# Patient Record
Sex: MALE | Race: WHITE | URBAN - METROPOLITAN AREA
[De-identification: names, ages, dates, MRNs, and addresses within clinical notes are randomized per-mention and may not be internally consistent; named-entity substitution may affect disease eponyms.]

---

## 2017-01-12 ENCOUNTER — OFFICE VISIT (OUTPATIENT)
Dept: ORTHOPEDIC SURGERY | Age: 67
End: 2017-01-12

## 2017-01-12 VITALS
DIASTOLIC BLOOD PRESSURE: 77 MMHG | SYSTOLIC BLOOD PRESSURE: 116 MMHG | BODY MASS INDEX: 23.56 KG/M2 | WEIGHT: 173.94 LBS | HEIGHT: 72 IN | HEART RATE: 72 BPM

## 2017-01-12 DIAGNOSIS — M25.512 LEFT SHOULDER PAIN, UNSPECIFIED CHRONICITY: ICD-10-CM

## 2017-01-12 DIAGNOSIS — M75.112 PARTIAL TEAR OF LEFT ROTATOR CUFF: Primary | ICD-10-CM

## 2017-01-12 PROCEDURE — 20610 DRAIN/INJ JOINT/BURSA W/O US: CPT | Performed by: ORTHOPAEDIC SURGERY

## 2017-01-12 PROCEDURE — 99213 OFFICE O/P EST LOW 20 MIN: CPT | Performed by: ORTHOPAEDIC SURGERY

## 2017-01-17 ENCOUNTER — EVALUATION (OUTPATIENT)
Dept: PHYSICAL THERAPY | Age: 67
End: 2017-01-17

## 2017-01-23 ENCOUNTER — EVALUATION (OUTPATIENT)
Dept: PHYSICAL THERAPY | Age: 67
End: 2017-01-23

## 2017-01-23 DIAGNOSIS — M75.22 BICEPS TENDINITIS, LEFT: ICD-10-CM

## 2017-01-23 DIAGNOSIS — M75.82 ROTATOR CUFF TENDINITIS, LEFT: Primary | ICD-10-CM

## 2017-01-23 PROCEDURE — G8984 CARRY CURRENT STATUS: HCPCS | Performed by: PHYSICAL THERAPIST

## 2017-01-23 PROCEDURE — G8985 CARRY GOAL STATUS: HCPCS | Performed by: PHYSICAL THERAPIST

## 2017-01-23 PROCEDURE — 97161 PT EVAL LOW COMPLEX 20 MIN: CPT | Performed by: PHYSICAL THERAPIST

## 2017-01-23 PROCEDURE — 97112 NEUROMUSCULAR REEDUCATION: CPT | Performed by: PHYSICAL THERAPIST

## 2017-01-23 PROCEDURE — 97110 THERAPEUTIC EXERCISES: CPT | Performed by: PHYSICAL THERAPIST

## 2017-01-30 ENCOUNTER — TREATMENT (OUTPATIENT)
Dept: PHYSICAL THERAPY | Age: 67
End: 2017-01-30

## 2017-01-30 DIAGNOSIS — M75.82 ROTATOR CUFF TENDINITIS, LEFT: Primary | ICD-10-CM

## 2017-01-30 DIAGNOSIS — M75.22 BICEPS TENDINITIS, LEFT: ICD-10-CM

## 2017-01-30 PROCEDURE — 97112 NEUROMUSCULAR REEDUCATION: CPT | Performed by: PHYSICAL THERAPIST

## 2017-01-30 PROCEDURE — 97110 THERAPEUTIC EXERCISES: CPT | Performed by: PHYSICAL THERAPIST

## 2017-02-06 ENCOUNTER — TREATMENT (OUTPATIENT)
Dept: PHYSICAL THERAPY | Age: 67
End: 2017-02-06

## 2017-02-06 DIAGNOSIS — M75.82 ROTATOR CUFF TENDINITIS, LEFT: Primary | ICD-10-CM

## 2017-02-06 DIAGNOSIS — M75.22 BICEPS TENDINITIS, LEFT: ICD-10-CM

## 2017-02-06 PROCEDURE — 97112 NEUROMUSCULAR REEDUCATION: CPT | Performed by: PHYSICAL THERAPIST

## 2017-02-06 PROCEDURE — 97110 THERAPEUTIC EXERCISES: CPT | Performed by: PHYSICAL THERAPIST

## 2017-02-13 ENCOUNTER — TREATMENT (OUTPATIENT)
Dept: PHYSICAL THERAPY | Age: 67
End: 2017-02-13

## 2017-02-13 DIAGNOSIS — M75.22 BICEPS TENDINITIS, LEFT: ICD-10-CM

## 2017-02-13 DIAGNOSIS — M75.82 ROTATOR CUFF TENDINITIS, LEFT: Primary | ICD-10-CM

## 2017-02-13 PROCEDURE — 97112 NEUROMUSCULAR REEDUCATION: CPT | Performed by: PHYSICAL THERAPIST

## 2017-02-13 PROCEDURE — 97110 THERAPEUTIC EXERCISES: CPT | Performed by: PHYSICAL THERAPIST

## 2017-02-14 ENCOUNTER — OFFICE VISIT (OUTPATIENT)
Dept: ORTHOPEDIC SURGERY | Age: 67
End: 2017-02-14

## 2017-02-14 VITALS
DIASTOLIC BLOOD PRESSURE: 84 MMHG | BODY MASS INDEX: 23.43 KG/M2 | SYSTOLIC BLOOD PRESSURE: 132 MMHG | WEIGHT: 173 LBS | HEIGHT: 72 IN | HEART RATE: 60 BPM

## 2017-02-14 DIAGNOSIS — M75.112 PARTIAL TEAR OF LEFT ROTATOR CUFF: Primary | ICD-10-CM

## 2017-02-14 DIAGNOSIS — M75.22 BICEPS TENDINITIS, LEFT: ICD-10-CM

## 2017-02-14 PROCEDURE — 99213 OFFICE O/P EST LOW 20 MIN: CPT | Performed by: ORTHOPAEDIC SURGERY

## 2017-04-18 ENCOUNTER — TELEPHONE (OUTPATIENT)
Dept: ENDOCRINOLOGY | Age: 67
End: 2017-04-18

## 2017-06-06 RX ORDER — MELOXICAM 15 MG/1
TABLET ORAL
Qty: 30 TABLET | Refills: 0 | Status: SHIPPED | OUTPATIENT
Start: 2017-06-06

## 2017-06-19 ENCOUNTER — OFFICE VISIT (OUTPATIENT)
Dept: ENDOCRINOLOGY | Age: 67
End: 2017-06-19

## 2017-06-19 VITALS
OXYGEN SATURATION: 98 % | WEIGHT: 177.8 LBS | SYSTOLIC BLOOD PRESSURE: 132 MMHG | HEART RATE: 63 BPM | HEIGHT: 71 IN | RESPIRATION RATE: 16 BRPM | DIASTOLIC BLOOD PRESSURE: 79 MMHG | BODY MASS INDEX: 24.89 KG/M2

## 2017-06-19 DIAGNOSIS — E78.01 FAMILIAL HYPERCHOLESTEROLEMIA: Primary | ICD-10-CM

## 2017-06-19 DIAGNOSIS — E55.9 VITAMIN D DEFICIENCY: ICD-10-CM

## 2017-06-19 DIAGNOSIS — Z78.9 STATIN INTOLERANCE: ICD-10-CM

## 2017-06-19 PROCEDURE — 99215 OFFICE O/P EST HI 40 MIN: CPT | Performed by: INTERNAL MEDICINE

## 2017-08-11 ENCOUNTER — PATIENT MESSAGE (OUTPATIENT)
Dept: ENDOCRINOLOGY | Age: 67
End: 2017-08-11

## 2017-10-06 NOTE — TELEPHONE ENCOUNTER
From: Price Flores  To: Paulina Millard MD  Sent: 10/6/2017 11:22 AM EDT  Subject: Medication Renewal Request    Original authorizing provider: MD Deonte Gross. Anson Mendez would like a refill of the following medications:  alirocumab (PRALUENT) 150 MG/ML SOPN injection pen Any Shen MD]    Preferred pharmacy: [facility]    Comment:  Refills need to be directed to [facility]. They are the specialty pharmacy for [facility] [place]. Contact me if you have questions, Melonie Thakur [phone] My next appointment with Dr. Celia Bowles is June 19, 2018. Will I have enough refills to last till then?

## 2018-06-19 ENCOUNTER — OFFICE VISIT (OUTPATIENT)
Dept: ENDOCRINOLOGY | Age: 68
End: 2018-06-19

## 2018-06-19 VITALS
SYSTOLIC BLOOD PRESSURE: 113 MMHG | BODY MASS INDEX: 24.52 KG/M2 | DIASTOLIC BLOOD PRESSURE: 68 MMHG | HEIGHT: 72 IN | OXYGEN SATURATION: 99 % | RESPIRATION RATE: 16 BRPM | WEIGHT: 181 LBS | HEART RATE: 68 BPM

## 2018-06-19 DIAGNOSIS — E78.49 OTHER HYPERLIPIDEMIA: Primary | ICD-10-CM

## 2018-06-19 DIAGNOSIS — Z78.9 STATIN INTOLERANCE: ICD-10-CM

## 2018-06-19 DIAGNOSIS — E55.9 VITAMIN D DEFICIENCY: ICD-10-CM

## 2018-06-19 PROCEDURE — G8427 DOCREV CUR MEDS BY ELIG CLIN: HCPCS | Performed by: INTERNAL MEDICINE

## 2018-06-19 PROCEDURE — 3017F COLORECTAL CA SCREEN DOC REV: CPT | Performed by: INTERNAL MEDICINE

## 2018-06-19 PROCEDURE — 4040F PNEUMOC VAC/ADMIN/RCVD: CPT | Performed by: INTERNAL MEDICINE

## 2018-06-19 PROCEDURE — 1036F TOBACCO NON-USER: CPT | Performed by: INTERNAL MEDICINE

## 2018-06-19 PROCEDURE — 1123F ACP DISCUSS/DSCN MKR DOCD: CPT | Performed by: INTERNAL MEDICINE

## 2018-06-19 PROCEDURE — G8420 CALC BMI NORM PARAMETERS: HCPCS | Performed by: INTERNAL MEDICINE

## 2018-06-19 PROCEDURE — 99213 OFFICE O/P EST LOW 20 MIN: CPT | Performed by: INTERNAL MEDICINE

## 2018-06-19 RX ORDER — SILDENAFIL 100 MG/1
100 TABLET, FILM COATED ORAL
COMMUNITY
Start: 2018-03-06

## 2018-06-19 RX ORDER — HYDROCHLOROTHIAZIDE 25 MG/1
25 TABLET ORAL
COMMUNITY
Start: 2018-06-14

## 2018-06-19 RX ORDER — LOSARTAN POTASSIUM 25 MG/1
25 TABLET ORAL
COMMUNITY
Start: 2017-05-03

## 2019-06-19 ENCOUNTER — OFFICE VISIT (OUTPATIENT)
Dept: ENDOCRINOLOGY | Age: 69
End: 2019-06-19
Payer: MEDICARE

## 2019-06-19 VITALS — WEIGHT: 187 LBS | DIASTOLIC BLOOD PRESSURE: 70 MMHG | BODY MASS INDEX: 25.72 KG/M2 | SYSTOLIC BLOOD PRESSURE: 130 MMHG

## 2019-06-19 DIAGNOSIS — E78.01 FAMILIAL HYPERCHOLESTEROLEMIA: Primary | ICD-10-CM

## 2019-06-19 PROCEDURE — 1036F TOBACCO NON-USER: CPT | Performed by: INTERNAL MEDICINE

## 2019-06-19 PROCEDURE — 4040F PNEUMOC VAC/ADMIN/RCVD: CPT | Performed by: INTERNAL MEDICINE

## 2019-06-19 PROCEDURE — G8419 CALC BMI OUT NRM PARAM NOF/U: HCPCS | Performed by: INTERNAL MEDICINE

## 2019-06-19 PROCEDURE — 99213 OFFICE O/P EST LOW 20 MIN: CPT | Performed by: INTERNAL MEDICINE

## 2019-06-19 PROCEDURE — G8427 DOCREV CUR MEDS BY ELIG CLIN: HCPCS | Performed by: INTERNAL MEDICINE

## 2019-06-19 PROCEDURE — 1123F ACP DISCUSS/DSCN MKR DOCD: CPT | Performed by: INTERNAL MEDICINE

## 2019-06-19 PROCEDURE — 3017F COLORECTAL CA SCREEN DOC REV: CPT | Performed by: INTERNAL MEDICINE

## 2019-06-19 NOTE — PROGRESS NOTES
Subjective:      70 y/o WM seen for hyperlipidemia    Interim:  Tolerating praluent    He was diagnosed with hyperlipidemia in . He was a patient of Dr. Lizett Hu    Tried simvastatin  , lipitor, crestor, lovastatin, zetia . Had muscle aches, intolerance  No h/o use of Welchol    Results:    3/18 LDL 84   LDL 63   Vit D 68    LDL 81  Vit D 73.5  8/15    10/14      Started on Praluent 75 on 9/11/15. Tolerating well. Stopped Crestor 10mg in October after 4weeks. Was having myalgias, much better now without Crestor 10mg. Carotid Artery Disease (Rt. ICA 40-59% stenosis and Lt ICA 1-39% stenosis from smooth atherosclerotic plaque)     He takes Vit D2 2000 IU daily     LDL 77     FH: Brothers, sisters, Dad Hyperlipidemia    Parents CAD. Dad  of CVA    SH: no smoking    Past Medical History:   Diagnosis Date    Hyperlipidemia     Hypertension      Past Surgical History:   Procedure Laterality Date    COLONOSCOPY      HERNIA REPAIR      umbilical     VASECTOMY       Current Outpatient Medications   Medication Sig Dispense Refill    Cholecalciferol (VITAMIN D3) 5000 units TABS Take by mouth      PRALUENT 150 MG/ML SOPN injection pen INJECT 150MG SUBCUTANEOUSLY EVERY 2 WEEKS 13 pen 1    hydrochlorothiazide (HYDRODIURIL) 25 MG tablet Take 25 mg by mouth      losartan (COZAAR) 25 MG tablet Take 25 mg by mouth      sildenafil (VIAGRA) 100 MG tablet Take 100 mg by mouth      meloxicam (MOBIC) 15 MG tablet TAKE ONE TABLET BY MOUTH DAILY 30 tablet 0    multivitamin (THERAGRAN) per tablet Take 1 tablet by mouth daily.  Coenzyme Q10 (COQ10) 200 MG CAPS Take 1 capsule by mouth 2 times daily.  aspirin 81 MG tablet Take 81 mg by mouth daily.  Glucosamine-Chondroit-Vit C-Mn (GLUCOSAMINE CHONDR 1500 COMPLX) CAPS Take 1 capsule by mouth 2 times daily. No current facility-administered medications for this visit.         Review of Systems  Reviewed and scanned     Objective:    /70   Wt 187 lb (84.8 kg)   BMI 25.72 kg/m²   Wt Readings from Last 3 Encounters:   06/19/19 187 lb (84.8 kg)   06/19/18 181 lb (82.1 kg)   06/19/17 177 lb 12.8 oz (80.6 kg)       Constitutional: Well-developed, appears stated age, cooperative, in no acute distress  H/E/N/M/T:atraumatic, normocephalic, external ears, nose, lips normal without lesions  Eyes: Lids, lashes, conjunctivae and sclerae normal, No proptosis, no redness  Neck: supple, symmetrical, no swelling  Skin: No obvious rashes or lesions present. Skin and hair texture normal  Psychiatric: Judgement and Insight:  judgement and insight appear normal  Neuro: Normal without focal findings, speech is normal normal, speech is spontaneous  Chest: No labored breathing, no chest deformity, no stridor  Musculoskeletal: No joint deformity, swelling    Lab Review  No results found for: TSH  No results found for: FREET4      Assessment: 1. Hyperlipidemia : Familial, Statin intolerance. Cholesterol control on Praluent, will continue given Carotid artery disease  2. Vitamin D deficiency: On replacement     Plan: 1. Continue Praluent  2. Vitamin D  2000 IU daily  3. Check lipids in one year

## 2020-03-17 RX ORDER — ALIROCUMAB 150 MG/ML
INJECTION, SOLUTION SUBCUTANEOUS
Qty: 6 ML | Refills: 2 | Status: SHIPPED | OUTPATIENT
Start: 2020-03-17 | End: 2021-02-25

## 2020-06-17 ENCOUNTER — OFFICE VISIT (OUTPATIENT)
Dept: ENDOCRINOLOGY | Age: 70
End: 2020-06-17
Payer: MEDICARE

## 2020-06-17 VITALS
HEIGHT: 72 IN | WEIGHT: 186.4 LBS | DIASTOLIC BLOOD PRESSURE: 80 MMHG | RESPIRATION RATE: 18 BRPM | HEART RATE: 67 BPM | SYSTOLIC BLOOD PRESSURE: 140 MMHG | BODY MASS INDEX: 25.25 KG/M2 | OXYGEN SATURATION: 96 %

## 2020-06-17 PROCEDURE — G8417 CALC BMI ABV UP PARAM F/U: HCPCS | Performed by: INTERNAL MEDICINE

## 2020-06-17 PROCEDURE — 1123F ACP DISCUSS/DSCN MKR DOCD: CPT | Performed by: INTERNAL MEDICINE

## 2020-06-17 PROCEDURE — G8427 DOCREV CUR MEDS BY ELIG CLIN: HCPCS | Performed by: INTERNAL MEDICINE

## 2020-06-17 PROCEDURE — 99213 OFFICE O/P EST LOW 20 MIN: CPT | Performed by: INTERNAL MEDICINE

## 2020-06-17 PROCEDURE — 4040F PNEUMOC VAC/ADMIN/RCVD: CPT | Performed by: INTERNAL MEDICINE

## 2020-06-17 PROCEDURE — 3017F COLORECTAL CA SCREEN DOC REV: CPT | Performed by: INTERNAL MEDICINE

## 2020-06-17 PROCEDURE — 1036F TOBACCO NON-USER: CPT | Performed by: INTERNAL MEDICINE

## 2021-02-25 RX ORDER — ALIROCUMAB 150 MG/ML
INJECTION, SOLUTION SUBCUTANEOUS
Qty: 6 ML | Refills: 2 | Status: SHIPPED | OUTPATIENT
Start: 2021-02-25 | End: 2021-11-05

## 2021-02-25 NOTE — TELEPHONE ENCOUNTER
Medication:   Requested Prescriptions     Pending Prescriptions Disp Refills    [id number] 150 MG/ML [place] [Pharmacy Med Name: [id number] PEN 150MG/ML] 6 mL 2     Sig: INJECT 150MG SUBCUTANEOUSLY EVERY 2 WEEKS       Last Filled:      Patient Phone Number: [phone] (home)     Last appt: 6/17/2020   Next appt: 6/16/2021    Last Labs DM:   Lab Results   Component Value Date    LABA1C 5.6 02/12/2014     Last Lipid:   Lab Results   Component Value Date    CHOL 171 08/23/2016    TRIG 126 08/23/2016    HDL 65 08/23/2016    LDLCALC 81 08/23/2016     Last PSA: No results found for: PSA  Last Thyroid:   Lab Results   Component Value Date    O2OTZKC 6.0 05/12/2016

## 2021-06-16 ENCOUNTER — OFFICE VISIT (OUTPATIENT)
Dept: ENDOCRINOLOGY | Age: 71
End: 2021-06-16
Payer: MEDICARE

## 2021-06-16 VITALS
DIASTOLIC BLOOD PRESSURE: 74 MMHG | OXYGEN SATURATION: 97 % | WEIGHT: 187 LBS | HEIGHT: 72 IN | HEART RATE: 75 BPM | BODY MASS INDEX: 25.33 KG/M2 | SYSTOLIC BLOOD PRESSURE: 123 MMHG

## 2021-06-16 DIAGNOSIS — R73.01 IMPAIRED FASTING BLOOD SUGAR: ICD-10-CM

## 2021-06-16 DIAGNOSIS — E78.49 OTHER HYPERLIPIDEMIA: Primary | ICD-10-CM

## 2021-06-16 PROCEDURE — 99214 OFFICE O/P EST MOD 30 MIN: CPT | Performed by: INTERNAL MEDICINE

## 2021-06-16 PROCEDURE — 3017F COLORECTAL CA SCREEN DOC REV: CPT | Performed by: INTERNAL MEDICINE

## 2021-06-16 PROCEDURE — 1036F TOBACCO NON-USER: CPT | Performed by: INTERNAL MEDICINE

## 2021-06-16 PROCEDURE — 1123F ACP DISCUSS/DSCN MKR DOCD: CPT | Performed by: INTERNAL MEDICINE

## 2021-06-16 PROCEDURE — G8427 DOCREV CUR MEDS BY ELIG CLIN: HCPCS | Performed by: INTERNAL MEDICINE

## 2021-06-16 PROCEDURE — 4040F PNEUMOC VAC/ADMIN/RCVD: CPT | Performed by: INTERNAL MEDICINE

## 2021-06-16 PROCEDURE — G8417 CALC BMI ABV UP PARAM F/U: HCPCS | Performed by: INTERNAL MEDICINE

## 2021-06-16 NOTE — PROGRESS NOTES
Subjective:      72 y/o WM seen for hyperlipidemia    Interim:  Tolerating praluent    He was diagnosed with hyperlipidemia in . Moderate, controlled    He was a patient of Dr. Justin Linder    Tried simvastatin  , lipitor, crestor, lovastatin, zetia . Had muscle aches, intolerance  No h/o use of Welchol    Results:    3/18 LDL 84   LDL 63   Vit D 68    LDL 81  Vit D 73.5  8/15    10/14      Started on Praluent 75 on 9/11/15. Tolerating well. Stopped Crestor 10mg in October after 4weeks. Was having myalgias, much better now without Crestor 10mg. Carotid Artery Disease (Rt. ICA 40-59% stenosis and Lt ICA 1-39% stenosis from smooth atherosclerotic plaque)     He takes Vit D2 2000 IU daily  Level controlled     LDL 77    LDL 63   LDL 80     He has HTN, on losartan and HCTZ    FH: Brothers, sisters, Dad Hyperlipidemia    Parents CAD. Dad  of CVA    SH: no smoking    Past Medical History:   Diagnosis Date    Hyperlipidemia     Hypertension      Past Surgical History:   Procedure Laterality Date    COLONOSCOPY      HERNIA REPAIR      umbilical     VASECTOMY       Current Outpatient Medications   Medication Sig Dispense Refill    PRALUENT 150 MG/ML SOAJ INJECT 150MG SUBCUTANEOUSLY EVERY 2 WEEKS 6 mL 2    Cholecalciferol (VITAMIN D3) 5000 units TABS Take by mouth      PRALUENT 150 MG/ML SOPN injection pen INJECT 150MG SUBCUTANEOUSLY EVERY 2 WEEKS 13 pen 1    hydrochlorothiazide (HYDRODIURIL) 25 MG tablet Take 25 mg by mouth      losartan (COZAAR) 25 MG tablet Take 25 mg by mouth      sildenafil (VIAGRA) 100 MG tablet Take 100 mg by mouth      meloxicam (MOBIC) 15 MG tablet TAKE ONE TABLET BY MOUTH DAILY 30 tablet 0    multivitamin (THERAGRAN) per tablet Take 1 tablet by mouth daily.  Coenzyme Q10 (COQ10) 200 MG CAPS Take 1 capsule by mouth 2 times daily.  aspirin 81 MG tablet Take 81 mg by mouth daily.        

## 2021-11-05 DIAGNOSIS — E78.49 OTHER HYPERLIPIDEMIA: Primary | ICD-10-CM

## 2021-11-05 RX ORDER — ALIROCUMAB 150 MG/ML
INJECTION, SOLUTION SUBCUTANEOUS
Qty: 6 ML | Refills: 3 | Status: SHIPPED | OUTPATIENT
Start: 2021-11-05 | End: 2022-10-18

## 2022-01-11 ENCOUNTER — TELEPHONE (OUTPATIENT)
Dept: ENDOCRINOLOGY | Age: 72
End: 2022-01-11

## 2022-01-11 NOTE — TELEPHONE ENCOUNTER
Submitted PA for Praluent 150MG/ML auto-injectors Key: [id number] - PA Case ID: [id number]   Via CM STATUS: Approved through 12/31/2022 under medicare part d

## 2022-06-15 ENCOUNTER — OFFICE VISIT (OUTPATIENT)
Dept: ENDOCRINOLOGY | Age: 72
End: 2022-06-15
Payer: MEDICARE

## 2022-06-15 VITALS
DIASTOLIC BLOOD PRESSURE: 72 MMHG | SYSTOLIC BLOOD PRESSURE: 121 MMHG | RESPIRATION RATE: 14 BRPM | HEIGHT: 72 IN | BODY MASS INDEX: 25.47 KG/M2 | TEMPERATURE: 98 F | WEIGHT: 188 LBS | HEART RATE: 67 BPM

## 2022-06-15 DIAGNOSIS — E55.9 VITAMIN D DEFICIENCY: ICD-10-CM

## 2022-06-15 DIAGNOSIS — E78.49 OTHER HYPERLIPIDEMIA: Primary | ICD-10-CM

## 2022-06-15 PROCEDURE — 1036F TOBACCO NON-USER: CPT | Performed by: INTERNAL MEDICINE

## 2022-06-15 PROCEDURE — 3017F COLORECTAL CA SCREEN DOC REV: CPT | Performed by: INTERNAL MEDICINE

## 2022-06-15 PROCEDURE — 99214 OFFICE O/P EST MOD 30 MIN: CPT | Performed by: INTERNAL MEDICINE

## 2022-06-15 PROCEDURE — G8417 CALC BMI ABV UP PARAM F/U: HCPCS | Performed by: INTERNAL MEDICINE

## 2022-06-15 PROCEDURE — G8427 DOCREV CUR MEDS BY ELIG CLIN: HCPCS | Performed by: INTERNAL MEDICINE

## 2022-06-15 PROCEDURE — 1123F ACP DISCUSS/DSCN MKR DOCD: CPT | Performed by: INTERNAL MEDICINE

## 2022-06-15 NOTE — PROGRESS NOTES
Subjective:      70 y/o WM seen for hyperlipidemia    Interim:  Taking praluent    Still has cramps/weakness  when standing  Even since off statin    He was diagnosed with hyperlipidemia in . Moderate, controlled    He was a patient of Dr. Patricia Walker    Tried simvastatin  , lipitor, crestor, lovastatin, zetia . Had muscle aches, intolerance  No h/o use of Welchol    Results:    3/18 LDL 84   LDL 63   Vit D 68    LDL 81  Vit D 73.5  8/15    10/14      Started on Praluent 75 on 9/11/15. Tolerating well. Stopped Crestor 10mg in October after 4weeks. Was having myalgias, much better now without Crestor 10mg. Carotid Artery Disease (Rt. ICA 40-59% stenosis and Lt ICA 1-39% stenosis from smooth atherosclerotic plaque)     He takes Vit D2 2000 IU daily  Level controlled     LDL 77    LDL 63   LDL 80    LDL 87  Tc 163    He has HTN, on losartan and HCTZ    FH: Brothers, sisters, Dad Hyperlipidemia    Parents CAD. Dad  of CVA    SH: no smoking    Past Medical History:   Diagnosis Date    Hyperlipidemia     Hypertension      Past Surgical History:   Procedure Laterality Date    COLONOSCOPY      HERNIA REPAIR      umbilical     VASECTOMY       Current Outpatient Medications   Medication Sig Dispense Refill    PRALUENT 150 MG/ML SOAJ INJECT 150MG SUBCUTANEOUSLY EVERY 2 WEEKS 6 mL 3    Cholecalciferol (VITAMIN D3) 5000 units TABS Take by mouth      hydrochlorothiazide (HYDRODIURIL) 25 MG tablet Take 25 mg by mouth      losartan (COZAAR) 25 MG tablet Take 25 mg by mouth      sildenafil (VIAGRA) 100 MG tablet Take 100 mg by mouth      meloxicam (MOBIC) 15 MG tablet TAKE ONE TABLET BY MOUTH DAILY 30 tablet 0    multivitamin (THERAGRAN) per tablet Take 1 tablet by mouth daily.  Coenzyme Q10 (COQ10) 200 MG CAPS Take 1 capsule by mouth 2 times daily.       Glucosamine-Chondroit-Vit C-Mn (GLUCOSAMINE CHONDR 1500 COMPLX) CAPS Take 1 capsule by mouth 2 times daily.  aspirin 81 MG tablet Take 81 mg by mouth daily. (Patient not taking: Reported on 6/15/2022)       No current facility-administered medications for this visit. Review of Systems  Reviewed and scanned     Objective:       Vitals:    06/15/22 0956   BP: 121/72   Pulse: 67   Resp: 14   Temp: 98 °F (36.7 °C)       Constitutional: Well-developed, appears stated age, cooperative, in no acute distress  H/E/N/M/T:atraumatic, normocephalic, external ears, nose, lips normal without lesions  Eyes: Lids, lashes, conjunctivae and sclerae normal, No proptosis, no redness  Neck: supple, symmetrical, no swelling  Skin: No obvious rashes or lesions present. Skin and hair texture normal  Psychiatric: Judgement and Insight:  judgement and insight appear normal  Neuro: Normal without focal findings, speech is normal normal, speech is spontaneous  Chest: No labored breathing, no chest deformity, no stridor  Musculoskeletal: No joint deformity, swelling    Lab Review  No results found for: TSH  No results found for: FREET4      Assessment: 1. Hyperlipidemia : Familial, Statin intolerance. Cholesterol control on Praluent, will continue given Carotid artery disease  2. Vitamin D deficiency: On replacement   3. HTN: at goal  4. Impaired fasting: Glucose 103, recommend life style change . Last 96mg/dl    Advised statin related side effects should not be persistent, recommend to see PCP or neurology for lower leg weakness    Plan: 1. Continue Praluent  2. Vitamin D  2000 IU daily  3. Check lipids in one year

## 2022-10-18 DIAGNOSIS — E78.49 OTHER HYPERLIPIDEMIA: ICD-10-CM

## 2022-10-18 RX ORDER — ALIROCUMAB 150 MG/ML
INJECTION, SOLUTION SUBCUTANEOUS
Qty: 6 ML | Refills: 3 | Status: SHIPPED | OUTPATIENT
Start: 2022-10-18

## 2022-10-18 NOTE — TELEPHONE ENCOUNTER
Medication:   Requested Prescriptions     Pending Prescriptions Disp Refills    PRALUENT 150 MG/ML SOAJ [Pharmacy Med Name: Chauncey Gonzales PEN 150MG/ML] 6 mL 3     Sig: INJECT 150MG SUBCUTANEOUSLY EVERY 2 WEEKS       Last Filled:      Patient Phone Number: [phone] (home)     Last appt: 6/15/2022   Next appt: 6/14/23    Last Lipid:   Lab Results   Component Value Date/Time    CHOL 171 08/23/2016 11:50 AM    TRIG 126 08/23/2016 11:50 AM    HDL 65 08/23/2016 11:50 AM    LDLCALC 81 08/23/2016 11:50 AM

## 2023-09-18 DIAGNOSIS — E78.49 OTHER HYPERLIPIDEMIA: ICD-10-CM

## 2023-09-18 RX ORDER — ALIROCUMAB 150 MG/ML
INJECTION, SOLUTION SUBCUTANEOUS
Qty: 6 ML | Refills: 3 | Status: SHIPPED | OUTPATIENT
Start: 2023-09-18

## 2023-09-18 NOTE — TELEPHONE ENCOUNTER
Medication:   Requested Prescriptions     Pending Prescriptions Disp Refills    PRALUENT 150 MG/ML SOAJ [Pharmacy Med Name: Adelina Sánchez PEN 150MG/ML] 6 mL 3     Sig: INJECT 150MG SUBCUTANEOUSLY EVERY 2 WEEKS       Last Filled:      Patient Phone Number: [phone] (home)     Last appt: 6/14/2023   Next appt: 6/12/2024    Last Labs DM:   Lab Results   Component Value Date/Time    LABA1C 5.6 02/12/2014 12:36 PM     Last Lipid:   Lab Results   Component Value Date/Time    CHOL 171 08/23/2016 11:50 AM    TRIG 126 08/23/2016 11:50 AM    HDL 65 08/23/2016 11:50 AM    LDLCALC 81 08/23/2016 11:50 AM     Last PSA: No results found for: \"PSA\"  Last Thyroid:   Lab Results   Component Value Date/Time    R6VSBSS 6.0 05/12/2016 10:51 AM

## 2024-06-03 ENCOUNTER — PATIENT MESSAGE (OUTPATIENT)
Dept: ENDOCRINOLOGY | Age: 74
End: 2024-06-03

## 2024-06-03 NOTE — TELEPHONE ENCOUNTER
From: Khalif Valentin  To: Dr. Brian Perdue  Sent: 6/3/2024 10:26 AM EDT  Subject: Blood tests    I have had blood tests done in April 2024 at [place]. I was told by your office that you can see those results. Please confirm you can in deed see them and that I do or do not need to have additional tests done before my appointment with you June 12.    Thanks

## 2024-06-03 NOTE — TELEPHONE ENCOUNTER
I checked in Epic, was not able to see them.  If he already  had labs, does not need repeat and can have them faxed

## 2024-06-12 ENCOUNTER — OFFICE VISIT (OUTPATIENT)
Dept: ENDOCRINOLOGY | Age: 74
End: 2024-06-12
Payer: MEDICARE

## 2024-06-12 ENCOUNTER — TELEPHONE (OUTPATIENT)
Dept: ENDOCRINOLOGY | Age: 74
End: 2024-06-12

## 2024-06-12 VITALS
OXYGEN SATURATION: 97 % | WEIGHT: 183 LBS | SYSTOLIC BLOOD PRESSURE: 140 MMHG | HEART RATE: 74 BPM | DIASTOLIC BLOOD PRESSURE: 78 MMHG | HEIGHT: 72 IN | RESPIRATION RATE: 16 BRPM | BODY MASS INDEX: 24.79 KG/M2

## 2024-06-12 DIAGNOSIS — E78.49 OTHER HYPERLIPIDEMIA: ICD-10-CM

## 2024-06-12 PROCEDURE — 99214 OFFICE O/P EST MOD 30 MIN: CPT | Performed by: INTERNAL MEDICINE

## 2024-06-12 PROCEDURE — G8427 DOCREV CUR MEDS BY ELIG CLIN: HCPCS | Performed by: INTERNAL MEDICINE

## 2024-06-12 PROCEDURE — 1123F ACP DISCUSS/DSCN MKR DOCD: CPT | Performed by: INTERNAL MEDICINE

## 2024-06-12 PROCEDURE — G2211 COMPLEX E/M VISIT ADD ON: HCPCS | Performed by: INTERNAL MEDICINE

## 2024-06-12 PROCEDURE — 3017F COLORECTAL CA SCREEN DOC REV: CPT | Performed by: INTERNAL MEDICINE

## 2024-06-12 PROCEDURE — 1036F TOBACCO NON-USER: CPT | Performed by: INTERNAL MEDICINE

## 2024-06-12 PROCEDURE — G8419 CALC BMI OUT NRM PARAM NOF/U: HCPCS | Performed by: INTERNAL MEDICINE

## 2024-06-12 RX ORDER — ALIROCUMAB 150 MG/ML
INJECTION, SOLUTION SUBCUTANEOUS
Qty: 6 ML | Refills: 3 | Status: SHIPPED | OUTPATIENT
Start: 2024-06-12

## 2024-06-12 RX ORDER — ALIROCUMAB 150 MG/ML
INJECTION, SOLUTION SUBCUTANEOUS
Qty: 6 ML | Refills: 3 | Status: SHIPPED | OUTPATIENT
Start: 2024-06-12 | End: 2024-06-12 | Stop reason: SDUPTHER

## 2024-06-12 NOTE — PROGRESS NOTES
MG tablet Take 1 tablet by mouth      Coenzyme Q10 (COQ10) 200 MG CAPS Take 1 capsule by mouth 2 times daily.       No current facility-administered medications for this visit.       Review of Systems  Reviewed and scanned     Objective:       Vitals:    06/12/24 1008   Resp: 16   SpO2: 97%         Constitutional: Well-developed, appears stated age, cooperative, in no acute distress  H/E/N/M/T:atraumatic, normocephalic, external ears, nose, lips normal without lesions  Eyes: Lids, lashes, conjunctivae and sclerae normal, No proptosis, no redness  Neck: supple, symmetrical, no swelling  Skin: No obvious rashes or lesions present.  Skin and hair texture normal  Psychiatric: Judgement and Insight:  judgement and insight appear normal  Neuro: Normal without focal findings, speech is normal normal, speech is spontaneous  Chest: No labored breathing, no chest deformity, no stridor  Musculoskeletal: No joint deformity, swelling    Lab Review  No results found for: \"TSH\"  No results found for: \"FREET4\"      Assessment:     1.Hyperlipidemia : Familial, Statin intolerance. Cholesterol controlled on Praluent, will continue given Carotid artery disease  2.Vitamin D deficiency: On replacement   3.HTN: at goal  4.Impaired fasting: Glucose 103, recommend life style change . Last 94 mg/dl  5.Low WBC: Advised see PCP    Plan:     1.Continue Praluent 150mg SC every 2 weeks  2.Vitamin D  2000 IU daily  3.Check lipids now and in one year

## 2024-12-26 ENCOUNTER — TELEPHONE (OUTPATIENT)
Dept: ENDOCRINOLOGY | Age: 74
End: 2024-12-26

## 2024-12-26 NOTE — TELEPHONE ENCOUNTER
Meagan from [facility] called stating medication below is not covered asking if repatha can be sent in instead please send to pharmacy below         Alirocumab (PRALUENT) 150 MG/ML SOAJ        [facility] - [place], [place] - [address] [facility] [phone] - F [phone] [address] [address]  Phone: [phone]  Fax: [phone]

## 2024-12-27 NOTE — TELEPHONE ENCOUNTER
Patient states he has 4 pens still of the pralulent and would like to hold off on switching right now. Patient will call when he needs the Repatha with understanding..

## 2025-04-30 ENCOUNTER — PATIENT MESSAGE (OUTPATIENT)
Dept: ENDOCRINOLOGY | Age: 75
End: 2025-04-30

## 2025-04-30 RX ORDER — EVOLOCUMAB 140 MG/ML
INJECTION, SOLUTION SUBCUTANEOUS
Qty: 7 ADJUSTABLE DOSE PRE-FILLED PEN SYRINGE | Refills: 2 | Status: SHIPPED | OUTPATIENT
Start: 2025-04-30

## 2025-04-30 NOTE — TELEPHONE ENCOUNTER
Please advise I sent the auto injector pre filled for the Repatha.   The repatha and Praluent although the same class of drugs but are different chemical so the dose is different.

## 2025-06-16 ENCOUNTER — OFFICE VISIT (OUTPATIENT)
Dept: ENDOCRINOLOGY | Age: 75
End: 2025-06-16
Payer: MEDICARE

## 2025-06-16 VITALS
DIASTOLIC BLOOD PRESSURE: 83 MMHG | HEART RATE: 68 BPM | SYSTOLIC BLOOD PRESSURE: 124 MMHG | BODY MASS INDEX: 24.76 KG/M2 | OXYGEN SATURATION: 98 % | WEIGHT: 180 LBS

## 2025-06-16 DIAGNOSIS — E78.49 OTHER HYPERLIPIDEMIA: Primary | ICD-10-CM

## 2025-06-16 DIAGNOSIS — E55.9 VITAMIN D DEFICIENCY: ICD-10-CM

## 2025-06-16 PROCEDURE — G8420 CALC BMI NORM PARAMETERS: HCPCS | Performed by: INTERNAL MEDICINE

## 2025-06-16 PROCEDURE — G2211 COMPLEX E/M VISIT ADD ON: HCPCS | Performed by: INTERNAL MEDICINE

## 2025-06-16 PROCEDURE — 1123F ACP DISCUSS/DSCN MKR DOCD: CPT | Performed by: INTERNAL MEDICINE

## 2025-06-16 PROCEDURE — G8427 DOCREV CUR MEDS BY ELIG CLIN: HCPCS | Performed by: INTERNAL MEDICINE

## 2025-06-16 PROCEDURE — 1159F MED LIST DOCD IN RCRD: CPT | Performed by: INTERNAL MEDICINE

## 2025-06-16 PROCEDURE — 1036F TOBACCO NON-USER: CPT | Performed by: INTERNAL MEDICINE

## 2025-06-16 PROCEDURE — 3017F COLORECTAL CA SCREEN DOC REV: CPT | Performed by: INTERNAL MEDICINE

## 2025-06-16 PROCEDURE — 99214 OFFICE O/P EST MOD 30 MIN: CPT | Performed by: INTERNAL MEDICINE

## 2025-06-16 NOTE — PROGRESS NOTES
Subjective:      70 y/o WM seen for hyperlipidemia    Interim:  Taking Repatha  Stable    Still has cramps/weakness  when standing  Even since off statin    He was diagnosed with hyperlipidemia in .     Moderate, controlled    He was a patient of Dr. Molina    Tried simvastatin  , lipitor, crestor, lovastatin, zetia .  Had muscle aches, intolerance  No h/o use of Welchol    Results:    3/18 LDL 84   LDL 63   Vit D 68    LDL 81  Vit D 73.5  8/15    10/14      Started on Praluent 75 on 9/11/15. Tolerating well.  Stopped Crestor 10mg in October after 4weeks. Was having myalgias, much better now without Crestor 10mg.      Carotid Artery Disease (Rt. ICA 40-59% stenosis and Lt ICA 1-39% stenosis from smooth atherosclerotic plaque)     He takes Vit D2 2000 IU daily  Level controlled     LDL 77    LDL 63   LDL 80    LDL 87  Tc 163   LDL 86    LDL 77  Vit D 108    He has HTN, on losartan and HCTZ    FH: Brothers, sisters, Dad Hyperlipidemia    Parents CAD. Dad  of CVA    SH: no smoking    Past Medical History:   Diagnosis Date    Hyperlipidemia     Hypertension      Past Surgical History:   Procedure Laterality Date    COLONOSCOPY      HERNIA REPAIR      umbilical     VASECTOMY       Current Outpatient Medications   Medication Sig Dispense Refill    Evolocumab (REPATHA SURECLICK) 140 MG/ML SOAJ 140 mg SC every 2 weeks   - Prefilled auto injector 7 Adjustable Dose Pre-filled Pen Syringe 2    Evolocumab (REPATHA) SOSY syringe 140mg SC every 2 weeks 6 each 4    Alirocumab (PRALUENT) 150 MG/ML SOAJ INJECT 150MG SUBCUTANEOUSLY EVERY 2 WEEKS 6 mL 3    Cholecalciferol (VITAMIN D3) 5000 units TABS Take by mouth      hydrochlorothiazide (HYDRODIURIL) 25 MG tablet Take 1 tablet by mouth      losartan (COZAAR) 25 MG tablet Take 1 tablet by mouth      sildenafil (VIAGRA) 100 MG tablet Take 1 tablet by mouth      meloxicam (MOBIC) 15 MG